# Patient Record
Sex: FEMALE | URBAN - METROPOLITAN AREA
[De-identification: names, ages, dates, MRNs, and addresses within clinical notes are randomized per-mention and may not be internally consistent; named-entity substitution may affect disease eponyms.]

---

## 2018-03-13 ENCOUNTER — APPOINTMENT (RX ONLY)
Dept: URBAN - METROPOLITAN AREA CLINIC 38 | Facility: CLINIC | Age: 28
Setting detail: DERMATOLOGY
End: 2018-03-13

## 2018-03-13 DIAGNOSIS — L20.89 OTHER ATOPIC DERMATITIS: ICD-10-CM

## 2018-03-13 PROBLEM — L20.84 INTRINSIC (ALLERGIC) ECZEMA: Status: ACTIVE | Noted: 2018-03-13

## 2018-03-13 PROCEDURE — 99202 OFFICE O/P NEW SF 15 MIN: CPT

## 2018-03-13 RX ORDER — TACROLIMUS 1 MG/G
OINTMENT TOPICAL
Qty: 100 | Refills: 3 | Status: ERX

## 2023-01-26 ENCOUNTER — APPOINTMENT (RX ONLY)
Dept: URBAN - METROPOLITAN AREA CLINIC 151 | Facility: CLINIC | Age: 33
Setting detail: DERMATOLOGY
End: 2023-01-26

## 2023-01-26 DIAGNOSIS — D22 MELANOCYTIC NEVI: ICD-10-CM

## 2023-01-26 PROBLEM — D22.39 MELANOCYTIC NEVI OF OTHER PARTS OF FACE: Status: ACTIVE | Noted: 2023-01-26

## 2023-01-26 PROBLEM — R61 GENERALIZED HYPERHIDROSIS: Status: ACTIVE | Noted: 2023-01-26

## 2023-01-26 PROBLEM — D22.4 MELANOCYTIC NEVI OF SCALP AND NECK: Status: ACTIVE | Noted: 2023-01-26

## 2023-01-26 PROCEDURE — ? DIAGNOSIS COMMENT

## 2023-01-26 PROCEDURE — 99204 OFFICE O/P NEW MOD 45 MIN: CPT

## 2023-01-26 PROCEDURE — ? OBSERVATION AND MEASURE

## 2023-01-26 PROCEDURE — ? COUNSELING

## 2023-01-26 PROCEDURE — ? PRESCRIPTION MEDICATION MANAGEMENT

## 2023-01-26 PROCEDURE — ? PHOTO-DOCUMENTATION

## 2023-01-26 ASSESSMENT — LOCATION SIMPLE DESCRIPTION DERM
LOCATION SIMPLE: RIGHT CHEEK
LOCATION SIMPLE: NECK
LOCATION SIMPLE: RIGHT FOREHEAD

## 2023-01-26 ASSESSMENT — LOCATION DETAILED DESCRIPTION DERM
LOCATION DETAILED: LEFT CENTRAL ANTERIOR NECK
LOCATION DETAILED: RIGHT FOREHEAD
LOCATION DETAILED: RIGHT INFERIOR CENTRAL MALAR CHEEK

## 2023-01-26 ASSESSMENT — LOCATION ZONE DERM
LOCATION ZONE: FACE
LOCATION ZONE: NECK

## 2023-01-26 NOTE — PROCEDURE: PRESCRIPTION MEDICATION MANAGEMENT
Render In Strict Bullet Format?: No
Detail Level: Zone
Plan: Pt will speak with psychiatrist regarding oral glycopyrrolate. We reviewed anticholinergic side effects.

## 2023-01-26 NOTE — PROCEDURE: MIPS QUALITY
Quality 226: Preventive Care And Screening: Tobacco Use: Screening And Cessation Intervention: Patient screened for tobacco use and is an ex/non-smoker
Quality 402: Tobacco Use And Help With Quitting Among Adolescents: Patient screened for tobacco and never smoked
Quality 130: Documentation Of Current Medications In The Medical Record: Current Medications Documented
Detail Level: Detailed
Quality 431: Preventive Care And Screening: Unhealthy Alcohol Use - Screening: Patient not identified as an unhealthy alcohol user when screened for unhealthy alcohol use using a systematic screening method
Quality 110: Preventive Care And Screening: Influenza Immunization: Influenza Immunization Administered during Influenza season

## 2023-01-26 NOTE — PROCEDURE: DIAGNOSIS COMMENT
Detail Level: Detailed
Comment: Present since high school. Reports normal thyroid testing in past. Denies B sx, HTN, abd pain, N/V. Does not feel a topical would help. We discussed oral glycopyrrolate, but she will talk to her psychiatrist first about this due to possibility of interacting with her medications and/or affecting her mood/anxiety. No family hx of thyroid disease or cancers.
Render Risk Assessment In Note?: no
Comment: Reassure pt that moles are benign. Pt states that pimple grow underneath the moles. Informed pt that removal would be performed via bx. Informed that no treatment is necessary. Photos taken for monitoring for any changes in appearance.

## 2023-01-26 NOTE — HPI: OTHER
Condition:: Sweating and skin growths
Please Describe Your Condition:: Present since high school, worse with anxiety but occurs without it. Sweats on face, buttocks, breasts the most but also palms/soles, less so axillae. \\n\\nSkin growths located on the face and neck. No treatment has been applied. States they get crusty but pimples form under them. No pain or itchy. They get caught on things. No personal or family hx skin cancer. No hx of mole removal. Hx of tanning bed use and sunburn blistering.

## 2023-01-31 RX ADMIN — GLYCOPYRROLATE: 1 TABLET ORAL at 00:00

## 2023-02-01 ENCOUNTER — APPOINTMENT (RX ONLY)
Dept: URBAN - METROPOLITAN AREA CLINIC 151 | Facility: CLINIC | Age: 33
Setting detail: DERMATOLOGY
End: 2023-02-01

## 2023-02-01 PROBLEM — R61 GENERALIZED HYPERHIDROSIS: Status: ACTIVE | Noted: 2023-02-01

## 2023-02-01 PROCEDURE — ? ORDER TESTS

## 2023-02-01 PROCEDURE — ? PRESCRIPTION

## 2023-02-01 RX ORDER — GLYCOPYRROLATE 1 MG/1
TABLET ORAL
Qty: 60 | Refills: 1 | Status: ERX | COMMUNITY
Start: 2023-01-31

## 2023-02-01 NOTE — PROCEDURE: ORDER TESTS
Billing Type: Third-Party Bill
Performing Laboratory: [id number]
Bill For Surgical Tray: no
Lab Facility: [id number]
Expected Date Of Service: 01/31/2023

## 2023-04-07 ENCOUNTER — RX ONLY (OUTPATIENT)
Age: 33
Setting detail: RX ONLY
End: 2023-04-07

## 2023-04-07 RX ORDER — GLYCOPYRROLATE 1 MG/1
TABLET ORAL
Qty: 60 | Refills: 0 | Status: ERX

## 2023-06-02 ENCOUNTER — RX ONLY (OUTPATIENT)
Age: 33
Setting detail: RX ONLY
End: 2023-06-02

## 2023-06-02 RX ORDER — GLYCOPYRROLATE 1 MG/1
TABLET ORAL
Qty: 60 | Refills: 0 | Status: ERX

## 2023-07-17 ENCOUNTER — APPOINTMENT (RX ONLY)
Dept: URBAN - METROPOLITAN AREA CLINIC 151 | Facility: CLINIC | Age: 33
Setting detail: DERMATOLOGY
End: 2023-07-17

## 2023-07-17 DIAGNOSIS — D22 MELANOCYTIC NEVI: ICD-10-CM

## 2023-07-17 PROBLEM — D22.4 MELANOCYTIC NEVI OF SCALP AND NECK: Status: ACTIVE | Noted: 2023-07-17

## 2023-07-17 PROBLEM — R61 GENERALIZED HYPERHIDROSIS: Status: ACTIVE | Noted: 2023-07-17

## 2023-07-17 PROBLEM — D22.39 MELANOCYTIC NEVI OF OTHER PARTS OF FACE: Status: ACTIVE | Noted: 2023-07-17

## 2023-07-17 PROCEDURE — ? ORDER TESTS

## 2023-07-17 PROCEDURE — ? COUNSELING

## 2023-07-17 PROCEDURE — ? DIAGNOSIS COMMENT

## 2023-07-17 PROCEDURE — ? PHOTO-DOCUMENTATION

## 2023-07-17 PROCEDURE — 99214 OFFICE O/P EST MOD 30 MIN: CPT

## 2023-07-17 PROCEDURE — ? OBSERVATION AND MEASURE

## 2023-07-17 PROCEDURE — ? PRESCRIPTION

## 2023-07-17 RX ORDER — GLYCOPYRROLATE 1 MG/1
TABLET ORAL
Qty: 120 | Refills: 5 | Status: ERX

## 2023-07-17 ASSESSMENT — LOCATION ZONE DERM
LOCATION ZONE: NECK
LOCATION ZONE: FACE

## 2023-07-17 ASSESSMENT — LOCATION SIMPLE DESCRIPTION DERM
LOCATION SIMPLE: NECK
LOCATION SIMPLE: RIGHT CHEEK
LOCATION SIMPLE: RIGHT FOREHEAD

## 2023-07-17 NOTE — PROCEDURE: DIAGNOSIS COMMENT
Comment: Reevaluated moles during visit, no significant change since last visit.
Detail Level: Detailed
Render Risk Assessment In Note?: no
Comment: Generalized hyperhidrosis. Present since high school. Reports normal thyroid testing in past. Denies B sx, HTN, abd pain, N/V. No FMH of thyroid disease or cancer. Will check labs. Oral glycopyrrolate has reduced her sweating 40%. Had constipation the first few weeks but this improved with probiotic. Will titrate up dose.

## 2023-07-17 NOTE — PROCEDURE: ORDER TESTS
Billing Type: Third-Party Bill
Performing Laboratory: [id number]
Bill For Surgical Tray: no
Lab Facility: [id number]
Expected Date Of Service: 01/31/2023

## 2024-08-22 ENCOUNTER — APPOINTMENT (RX ONLY)
Dept: URBAN - METROPOLITAN AREA CLINIC 38 | Facility: CLINIC | Age: 34
Setting detail: DERMATOLOGY
End: 2024-08-22

## 2024-08-22 DIAGNOSIS — L20.89 OTHER ATOPIC DERMATITIS: ICD-10-CM | Status: INADEQUATELY CONTROLLED

## 2024-08-22 PROCEDURE — ? PRESCRIPTION

## 2024-08-22 PROCEDURE — 99204 OFFICE O/P NEW MOD 45 MIN: CPT

## 2024-08-22 PROCEDURE — ? ORDER TESTS

## 2024-08-22 PROCEDURE — ? COUNSELING

## 2024-08-22 RX ORDER — TACROLIMUS 1 MG/G
OINTMENT TOPICAL
Qty: 100 | Refills: 2 | Status: ERX | COMMUNITY
Start: 2024-08-22

## 2024-08-22 RX ADMIN — TACROLIMUS: 1 OINTMENT TOPICAL at 00:00

## 2024-08-22 ASSESSMENT — LOCATION ZONE DERM
LOCATION ZONE: ANUS
LOCATION ZONE: TRUNK

## 2024-08-22 ASSESSMENT — LOCATION DETAILED DESCRIPTION DERM
LOCATION DETAILED: RIGHT MEDIAL BUTTOCK
LOCATION DETAILED: PERIANAL SKIN

## 2024-08-22 ASSESSMENT — LOCATION SIMPLE DESCRIPTION DERM
LOCATION SIMPLE: RIGHT BUTTOCK
LOCATION SIMPLE: PERIANAL SKIN

## 2024-08-22 NOTE — PROCEDURE: ORDER TESTS
Lab Facility: [id number]
Bill For Surgical Tray: no
Performing Laboratory: -[id number]
Billing Type: Third-Party Bill
Expected Date Of Service: 08/22/2024